# Patient Record
Sex: MALE | Race: WHITE | URBAN - NONMETROPOLITAN AREA
[De-identification: names, ages, dates, MRNs, and addresses within clinical notes are randomized per-mention and may not be internally consistent; named-entity substitution may affect disease eponyms.]

---

## 2023-01-13 ENCOUNTER — OFFICE VISIT (OUTPATIENT)
Dept: URBAN - NONMETROPOLITAN AREA CLINIC 13 | Facility: CLINIC | Age: 23
End: 2023-01-13
Payer: COMMERCIAL

## 2023-01-13 DIAGNOSIS — T15.01XA FOREIGN BODY IN CORNEA, RIGHT EYE: Primary | ICD-10-CM

## 2023-01-13 PROCEDURE — 99203 OFFICE O/P NEW LOW 30 MIN: CPT | Performed by: OPHTHALMOLOGY

## 2023-01-13 RX ORDER — PREDNISOLONE ACETATE 10 MG/ML
1 % SUSPENSION/ DROPS OPHTHALMIC
Qty: 5 | Refills: 1 | Status: ACTIVE
Start: 2023-01-13

## 2023-01-13 ASSESSMENT — INTRAOCULAR PRESSURE: OS: 13

## 2023-01-13 NOTE — IMPRESSION/PLAN
Impression: Foreign body in cornea, right eye: T15.01xA. Plan: small linear deposit x2 r.b.a removal discussed no signs of inflammation will observe and recheck in 1 week. antibiotic drops QID, at's and start prednisolone daily.

## 2023-01-24 ENCOUNTER — OFFICE VISIT (OUTPATIENT)
Dept: URBAN - NONMETROPOLITAN AREA CLINIC 13 | Facility: CLINIC | Age: 23
End: 2023-01-24
Payer: COMMERCIAL

## 2023-01-24 DIAGNOSIS — T15.01XA FOREIGN BODY IN CORNEA, RIGHT EYE: Primary | ICD-10-CM

## 2023-01-24 PROCEDURE — 99213 OFFICE O/P EST LOW 20 MIN: CPT | Performed by: OPHTHALMOLOGY

## 2023-01-24 ASSESSMENT — INTRAOCULAR PRESSURE
OD: 14
OS: 14

## 2023-01-24 NOTE — IMPRESSION/PLAN
Impression: Foreign body in cornea, right eye: T15.01XA. Plan: did not get drops as instructed. eye looks same today.   will start topical prednisolone TID OD return 2 weeks reeval